# Patient Record
Sex: MALE | Race: WHITE | NOT HISPANIC OR LATINO | URBAN - NONMETROPOLITAN AREA
[De-identification: names, ages, dates, MRNs, and addresses within clinical notes are randomized per-mention and may not be internally consistent; named-entity substitution may affect disease eponyms.]

---

## 2021-04-29 ENCOUNTER — IMPORTED ENCOUNTER (OUTPATIENT)
Dept: URBAN - NONMETROPOLITAN AREA CLINIC 1 | Facility: CLINIC | Age: 86
End: 2021-04-29

## 2021-04-29 PROBLEM — H35.362: Noted: 2021-04-29

## 2021-04-29 PROBLEM — Z96.1: Noted: 2021-04-29

## 2021-04-29 PROCEDURE — 92014 COMPRE OPH EXAM EST PT 1/>: CPT

## 2021-04-29 NOTE — PATIENT DISCUSSION
Olga Lidia OS Discussed w/pt Continue to monitor PC IOL OU Continue to monitor recommend refresh relieva for discomfort; 's Notes: Vetrans Choice

## 2022-04-09 ASSESSMENT — VISUAL ACUITY
OD_CC: 20/30
OS_CC: 20/30

## 2022-04-09 ASSESSMENT — TONOMETRY
OD_IOP_MMHG: 14
OS_IOP_MMHG: 14